# Patient Record
Sex: MALE | Race: OTHER
[De-identification: names, ages, dates, MRNs, and addresses within clinical notes are randomized per-mention and may not be internally consistent; named-entity substitution may affect disease eponyms.]

---

## 2019-07-30 ENCOUNTER — HOSPITAL ENCOUNTER (EMERGENCY)
Dept: HOSPITAL 12 - ER | Age: 29
Discharge: HOME | End: 2019-07-30
Payer: MEDICAID

## 2019-07-30 VITALS — DIASTOLIC BLOOD PRESSURE: 73 MMHG | SYSTOLIC BLOOD PRESSURE: 121 MMHG

## 2019-07-30 VITALS — WEIGHT: 135 LBS | BODY MASS INDEX: 22.49 KG/M2 | HEIGHT: 65 IN

## 2019-07-30 DIAGNOSIS — J44.1: Primary | ICD-10-CM

## 2019-07-30 DIAGNOSIS — J20.9: ICD-10-CM

## 2019-07-30 DIAGNOSIS — F17.290: ICD-10-CM

## 2019-07-30 RX ADMIN — Medication ONE MG: at 02:35

## 2019-07-30 RX ADMIN — ALBUTEROL SULFATE ONE MG: 2.5 SOLUTION RESPIRATORY (INHALATION) at 02:28

## 2019-07-30 RX ADMIN — ALBUTEROL SULFATE ONE MG: 2.5 SOLUTION RESPIRATORY (INHALATION) at 03:27

## 2019-07-30 NOTE — NUR
Patient ambulated with stable gait. Speech clear, speaks in complete sentences. 
No neuro deficits noted. A/Ox4. Patient came for c/o having difficulty 
breathing. Patient is a current everyday cigarette and marijuana smoker. No 
cardiovascular distress noted.